# Patient Record
Sex: FEMALE
[De-identification: names, ages, dates, MRNs, and addresses within clinical notes are randomized per-mention and may not be internally consistent; named-entity substitution may affect disease eponyms.]

---

## 2018-03-21 ENCOUNTER — HOSPITAL ENCOUNTER (OUTPATIENT)
Dept: HOSPITAL 5 - SPVIMAG | Age: 63
Discharge: HOME | End: 2018-03-21
Attending: ORTHOPAEDIC SURGERY
Payer: COMMERCIAL

## 2018-03-21 DIAGNOSIS — M17.12: Primary | ICD-10-CM

## 2018-03-21 DIAGNOSIS — M85.862: ICD-10-CM

## 2018-03-21 NOTE — XRAY REPORT
XRAY LEFT KNEE 4 THREE VIEWS: 03/21/18





CLINICAL: Left knee pain.



FINDINGS: Mild osteopenia.  No fracture or dislocation.  The medial and 

lateral joint spaces are normal.  Small patellofemoral osteophytes.  No 

joint effusion.Normal soft tissues.



IMPRESSION: Osteopenia and mild patellofemoral joint osteoarthritis.